# Patient Record
Sex: FEMALE | URBAN - METROPOLITAN AREA
[De-identification: names, ages, dates, MRNs, and addresses within clinical notes are randomized per-mention and may not be internally consistent; named-entity substitution may affect disease eponyms.]

---

## 2024-10-10 ENCOUNTER — ANCILLARY PROCEDURE (OUTPATIENT)
Dept: URGENT CARE | Age: 24
End: 2024-10-10
Payer: COMMERCIAL

## 2024-10-10 ENCOUNTER — OFFICE VISIT (OUTPATIENT)
Dept: URGENT CARE | Age: 24
End: 2024-10-10
Payer: COMMERCIAL

## 2024-10-10 VITALS
SYSTOLIC BLOOD PRESSURE: 122 MMHG | TEMPERATURE: 97.1 F | DIASTOLIC BLOOD PRESSURE: 82 MMHG | WEIGHT: 151 LBS | RESPIRATION RATE: 16 BRPM | BODY MASS INDEX: 21.62 KG/M2 | HEART RATE: 75 BPM | OXYGEN SATURATION: 98 % | HEIGHT: 70 IN

## 2024-10-10 DIAGNOSIS — J06.9 VIRAL URI WITH COUGH: Primary | ICD-10-CM

## 2024-10-10 DIAGNOSIS — R05.1 ACUTE COUGH: ICD-10-CM

## 2024-10-10 DIAGNOSIS — Z20.822 CONTACT WITH AND (SUSPECTED) EXPOSURE TO COVID-19: ICD-10-CM

## 2024-10-10 DIAGNOSIS — J02.9 SORE THROAT: ICD-10-CM

## 2024-10-10 LAB
POC RAPID STREP: NEGATIVE
POC SARS-COV-2 AG BINAX: NORMAL

## 2024-10-10 PROCEDURE — 71046 X-RAY EXAM CHEST 2 VIEWS: CPT | Performed by: PHYSICIAN ASSISTANT

## 2024-10-10 RX ORDER — ALBUTEROL SULFATE 90 UG/1
2 INHALANT RESPIRATORY (INHALATION) EVERY 6 HOURS PRN
Qty: 18 G | Refills: 0 | Status: SHIPPED | OUTPATIENT
Start: 2024-10-10 | End: 2025-10-10

## 2024-10-10 RX ORDER — NORGESTIMATE AND ETHINYL ESTRADIOL 7DAYSX3 LO
1 KIT ORAL
COMMUNITY
Start: 2024-08-08

## 2024-10-10 RX ORDER — DULOXETIN HYDROCHLORIDE 30 MG/1
1 CAPSULE, DELAYED RELEASE ORAL
COMMUNITY
Start: 2024-09-05

## 2024-10-10 RX ORDER — METHYLPREDNISOLONE 4 MG/1
TABLET ORAL
Qty: 21 TABLET | Refills: 0 | Status: SHIPPED | OUTPATIENT
Start: 2024-10-10

## 2024-10-10 ASSESSMENT — ENCOUNTER SYMPTOMS
COUGH: 1
SORE THROAT: 1
CARDIOVASCULAR NEGATIVE: 1

## 2024-10-10 NOTE — LETTER
October 10, 2024     Patient: Carmen Palacio   YOB: 2000   Date of Visit: 10/10/2024       To Whom It May Concern:    It is my medical opinion that Carmen Palacio  be off work x 2-3 days due to illness. Return as tolerated .    If you have any questions or concerns, please don't hesitate to call.         Sincerely,        Soledad Babb PA-C    CC: No Recipients

## 2024-10-10 NOTE — PROGRESS NOTES
"Subjective   Patient ID: Carmen Palacio is a 23 y.o. female. They present today with a chief complaint of Sore Throat (X 3 days ).    History of Present Illness  Carmen is a healthy 23 year old female presents to  with c/o ST, chest congestion and fatigue. Denies fevers, chills or body aches. She notes an intermittent pain across chest with deep breathing and movement. No resp hx including asthma. Denies CP, SOB or palpitations.           Past Medical History  Allergies as of 10/10/2024 - Reviewed 10/10/2024   Allergen Reaction Noted    Penicillins Hives 10/10/2024       (Not in a hospital admission)       No past medical history on file.    No past surgical history on file.     reports that she has never smoked. She has never used smokeless tobacco. Alcohol use questions deferred to the physician. Drug use questions deferred to the physician.    Review of Systems  Review of Systems   HENT:  Positive for sore throat.    Respiratory:  Positive for cough.    Cardiovascular: Negative.                                   Objective    Vitals:    10/10/24 1018   BP: 122/82   Pulse: 75   Resp: 16   Temp: 36.2 °C (97.1 °F)   SpO2: 98%   Weight: 68.5 kg (151 lb)   Height: 1.778 m (5' 10\")     Patient's last menstrual period was 09/19/2024 (approximate).    Physical Exam  Vitals and nursing note reviewed.   Constitutional:       General: She is not in acute distress.     Appearance: Normal appearance.   HENT:      Head: Normocephalic and atraumatic.      Right Ear: Tympanic membrane and ear canal normal.      Left Ear: Tympanic membrane and ear canal normal.      Nose: No congestion or rhinorrhea.      Comments: Yellow PND noted. No max/eth TTP.      Mouth/Throat:      Mouth: Mucous membranes are moist.      Pharynx: No oropharyngeal exudate or posterior oropharyngeal erythema.   Eyes:      Extraocular Movements: Extraocular movements intact.      Conjunctiva/sclera: Conjunctivae normal.      Pupils: Pupils are equal, " round, and reactive to light.   Cardiovascular:      Rate and Rhythm: Normal rate and regular rhythm.      Heart sounds: No murmur heard.  Pulmonary:      Effort: Pulmonary effort is normal.      Breath sounds: Normal breath sounds. No wheezing.   Skin:     General: Skin is warm and dry.   Neurological:      General: No focal deficit present.      Mental Status: She is alert and oriented to person, place, and time.   Psychiatric:         Mood and Affect: Mood normal.         Procedures    Point of Care Test & Imaging Results from this visit  Results for orders placed or performed in visit on 10/10/24   POCT rapid strep A manually resulted   Result Value Ref Range    POC Rapid Strep Negative Negative   POCT Covid-19 Rapid Antigen   Result Value Ref Range    POC MARC-COV-2 AG  Presumptive negative test for SARS-CoV-2 (no antigen detected)     Presumptive negative test for SARS-CoV-2 (no antigen detected)      XR chest 2 views    Result Date: 10/10/2024  Interpreted By:  Malena Sue, STUDY: Chest, 2 views.   INDICATION: Signs/Symptoms:cough, pleuritic CP.   COMPARISON: None.   ACCESSION NUMBER(S): [id number]   ORDERING CLINICIAN: SOLEDAD BERMEO   FINDINGS: The cardiomediastinal silhouette size is within normal limits.   There is no focal consolidation, edema or pneumothorax. No sizeable pleural effusion.       1. No acute cardiopulmonary process.   MACRO: None.   Signed by: Malena Sue 10/10/2024 11:11 AM Dictation workstation:   [id number]     Diagnostic study results (if any) were reviewed by Soledad Bermeo PA-C.    Assessment/Plan   Allergies, medications, history, and pertinent labs/EKGs/Imaging reviewed by Soledad Bermeo PA-C.     Medical Decision Making    Carmen presents with c/o ST, cough, chest congestion and fatigue x 3 days. Rapid GAS and COVID 19 negative. CXR negative. No signs of acute bacterial pathology on exam today. She does have some pleuritic chest pain today. Will start medrol  dosepack, albtuerol PRN. Continue OTC cough and cold medications with close follow up for failure to improve/worsening symptoms. Plan of care discussed with patient and/or family who verbalized understanding. Recommend Follow up with PCP. Advised seeking immediate emergency medical attention if symptoms fail to improve, worsen or any concerning symptoms arise. Patient/Guardian voiced full understanding and agreement to plan.      Orders and Diagnoses  Diagnoses and all orders for this visit:  Viral URI with cough  -     XR chest 2 views; Future  -     methylPREDNISolone (Medrol Dospak) 4 mg tablets; Take as directed on package.  -     albuterol 90 mcg/actuation inhaler; Inhale 2 puffs every 6 hours if needed for wheezing.  Sore throat  -     POCT rapid strep A manually resulted  Contact with and (suspected) exposure to covid-19  -     POCT Covid-19 Rapid Antigen      Medical Admin Record      Patient disposition: Home    Electronically signed by Soledad Babb PA-C  11:28 AM

## 2024-12-03 ENCOUNTER — OFFICE VISIT (OUTPATIENT)
Dept: URGENT CARE | Age: 24
End: 2024-12-03
Payer: COMMERCIAL

## 2024-12-03 VITALS
HEART RATE: 77 BPM | BODY MASS INDEX: 21.47 KG/M2 | OXYGEN SATURATION: 99 % | WEIGHT: 150 LBS | TEMPERATURE: 97.6 F | HEIGHT: 70 IN | DIASTOLIC BLOOD PRESSURE: 88 MMHG | SYSTOLIC BLOOD PRESSURE: 128 MMHG | RESPIRATION RATE: 15 BRPM

## 2024-12-03 DIAGNOSIS — J01.10 ACUTE FRONTAL SINUSITIS, RECURRENCE NOT SPECIFIED: Primary | ICD-10-CM

## 2024-12-03 RX ORDER — CEFDINIR 300 MG/1
300 CAPSULE ORAL 2 TIMES DAILY
Qty: 20 CAPSULE | Refills: 0 | Status: SHIPPED | OUTPATIENT
Start: 2024-12-03 | End: 2024-12-13

## 2024-12-03 NOTE — PROGRESS NOTES
"Subjective   Patient ID: Carmen Palacio is a 24 y.o. female. They present today with a chief complaint of Pressure Behind the Eyes (Over 1 week ).    History of Present Illness  HPI  24-year-old female with no past medical history presents with a pressure feeling behind her eyes for over a week.  Patient denies fevers or chills.  She has been congested and states she has had increased postnasal drip.  Frontal headaches.  Non-smoker.  She knows of no exposures to COVID or flu.  Past Medical History  Allergies as of 12/03/2024 - Reviewed 12/03/2024   Allergen Reaction Noted    Penicillins Hives 10/10/2024       (Not in a hospital admission)       No past medical history on file.    No past surgical history on file.     reports that she has never smoked. She has never used smokeless tobacco. Alcohol use questions deferred to the physician. Drug use questions deferred to the physician.    Review of Systems  Review of Systems     As in history of present illness                          Objective    Vitals:    12/03/24 1831   BP: 128/88   Pulse: 77   Resp: 15   Temp: 36.4 °C (97.6 °F)   SpO2: 99%   Weight: 68 kg (150 lb)   Height: 1.778 m (5' 10\")     Patient's last menstrual period was 11/21/2024 (approximate).    Physical Exam  Gen.-alert cooperative and in no apparent distress  Head and face- no swelling redness, tenderness or rash  Eyes-EOMI, no redness or discharge is noted  ENT- bilateral nasal congestion with clear rhinorrhea and postnasal drip in oral pharynx  Neck- normal range of motion with no lymphadenopathy or mass.   Pulmonary- no respiratory distress noted. Lungs clear to auscultation without wheezes rhonchi or rales  Skin- no rashes discoloration or skin lesions noted  Lymphatic-- no lymph node swelling or tenderness appreciated Procedures    Point of Care Test & Imaging Results from this visit  No results found for this visit on 12/03/24.   No results found.    Diagnostic study results (if any) were " reviewed by Sacha Simons MD.    Assessment/Plan   Allergies, medications, history, and pertinent labs/EKGs/Imaging reviewed by Sacha Simons MD.     Medical Decision Making  At time of discharge patient was clinically well-appearing and HDS for outpatient management. The patient and/or family was educated regarding diagnosis, supportive care, OTC and Rx medications. The patient and/or family was given the opportunity to ask questions prior to discharge.  They verbalized understanding of my discussion of the plans for treatment, expected course, indications to return to  or seek further evaluation in ED, and the need for timely follow up as directed.   They were provided with a work/school excuse if requested.    Orders and Diagnoses  Diagnoses and all orders for this visit:  Acute frontal sinusitis, recurrence not specified  -     cefdinir (Omnicef) 300 mg capsule; Take 1 capsule (300 mg) by mouth 2 times a day for 10 days.      Medical Admin Record      Patient disposition: Home    Electronically signed by Sacha Simons MD  6:46 PM

## 2024-12-03 NOTE — PATIENT INSTRUCTIONS
Medication as directed  Increase fluids and rest  Decongestants, such as Mucinex D and Flonase nasal spray as discussed  Motrin or Tylenol as needed for pain or fever  Gargle with lightly salted warm water several times a day